# Patient Record
(demographics unavailable — no encounter records)

---

## 2024-11-06 NOTE — HISTORY OF PRESENT ILLNESS
[FreeTextEntry1] : Follow up  [de-identified] : #HLD -  in Jul Spoke to dietitian  Made dietary changes and would like to re-test  #Austim and ADHD Recently diagnosed and is on atomoxetine - Strattera 80mg BID Has a trauma therapist Looking to seek assistance from OT for executive functioning  #Knee pain Patellofemoral pain syndrome as per ortho Does strength training  #Environemental allergies - Requesting refill of nasal sprays  - Taking OTC Allegra 180mg   #HCM: - Colonoscopy: April 2023>>>10 years - Mammogram: Jul 2024 - BI-RADS 2, dense breast tissue  - Pap smear: UTD Aug 2024 - ASC-US, no HPV - Vaccines: Shingrix, Tdap, COVID/Flu - has them all scheduled at a pharmacy

## 2024-11-06 NOTE — HISTORY OF PRESENT ILLNESS
[FreeTextEntry1] : Follow up  [de-identified] : #HLD -  in Jul Spoke to dietitian  Made dietary changes and would like to re-test  #Austim and ADHD Recently diagnosed and is on atomoxetine - Strattera 80mg BID Has a trauma therapist Looking to seek assistance from OT for executive functioning  #Knee pain Patellofemoral pain syndrome as per ortho Does strength training  #Environemental allergies - Requesting refill of nasal sprays  - Taking OTC Allegra 180mg   #HCM: - Colonoscopy: April 2023>>>10 years - Mammogram: Jul 2024 - BI-RADS 2, dense breast tissue  - Pap smear: UTD Aug 2024 - ASC-US, no HPV - Vaccines: Shingrix, Tdap, COVID/Flu - has them all scheduled at a pharmacy

## 2024-11-06 NOTE — ASSESSMENT
[FreeTextEntry1] : #HLD -  in Jul - Re-test lipid profile  #Austim and ADHD - C/w atomoxetine - Strattera 80mg BID as per psych - C/w trauma therapy - Refer to OT  #Knee pain Patellofemoral pain syndrome as per ortho - C/w strength training  #Environemental allergies - Refill mometasone and azelastine   - C/w PRN OTC Allegra 180mg   #HCM: - Colonoscopy: April 2023>>>10 years - Mammogram: Jul 2024 - BI-RADS 2, dense breast tissue  - Pap smear: UTD Aug 2024 - ASC-US, no HPV - Vaccines: Shingrix, Tdap, COVID/Flu - has them all scheduled at a pharmacy

## 2024-11-06 NOTE — PHYSICAL EXAM
[No Acute Distress] : no acute distress [Well Nourished] : well nourished [Well Developed] : well developed [Well-Appearing] : well-appearing [Normal Voice/Communication] : normal voice/communication [Normal Sclera/Conjunctiva] : normal sclera/conjunctiva [EOMI] : extraocular movements intact [Supple] : supple [No Respiratory Distress] : no respiratory distress  [Normal Rate] : normal rate  [Normal Gait] : normal gait [Speech Grossly Normal] : speech grossly normal [Memory Grossly Normal] : memory grossly normal [Normal Affect] : the affect was normal [Alert and Oriented x3] : oriented to person, place, and time [Normal Mood] : the mood was normal [Normal Insight/Judgement] : insight and judgment were intact